# Patient Record
Sex: MALE | Race: WHITE | NOT HISPANIC OR LATINO | URBAN - METROPOLITAN AREA
[De-identification: names, ages, dates, MRNs, and addresses within clinical notes are randomized per-mention and may not be internally consistent; named-entity substitution may affect disease eponyms.]

---

## 2017-08-11 ENCOUNTER — EMERGENCY (EMERGENCY)
Facility: HOSPITAL | Age: 42
LOS: 1 days | Discharge: LEFT BEFORE DISCHARGE | End: 2017-08-11
Attending: EMERGENCY MEDICINE | Admitting: EMERGENCY MEDICINE
Payer: COMMERCIAL

## 2017-08-11 VITALS
TEMPERATURE: 98 F | WEIGHT: 139.99 LBS | RESPIRATION RATE: 17 BRPM | HEART RATE: 82 BPM | DIASTOLIC BLOOD PRESSURE: 88 MMHG | SYSTOLIC BLOOD PRESSURE: 135 MMHG | OXYGEN SATURATION: 97 %

## 2017-08-11 DIAGNOSIS — R06.02 SHORTNESS OF BREATH: ICD-10-CM

## 2017-08-11 DIAGNOSIS — T81.9XXA UNSPECIFIED COMPLICATION OF PROCEDURE, INITIAL ENCOUNTER: ICD-10-CM

## 2017-08-11 PROCEDURE — 71250 CT THORAX DX C-: CPT

## 2017-08-11 PROCEDURE — 99284 EMERGENCY DEPT VISIT MOD MDM: CPT | Mod: 25

## 2017-08-11 PROCEDURE — 99284 EMERGENCY DEPT VISIT MOD MDM: CPT

## 2017-08-11 PROCEDURE — 71250 CT THORAX DX C-: CPT | Mod: 26

## 2017-08-11 NOTE — ED ADULT NURSE NOTE - CHPI ED SYMPTOMS NEG
no wheezing/no chills/no edema/no body aches/no headache/no chest pain/no hemoptysis/no fever/no diaphoresis/no cough

## 2017-08-11 NOTE — ED ADULT NURSE NOTE - PERIPHERAL VASCULAR
History of Present Illness





- History of Present Illness


History of Present Illness: 


Reason For Consultation: Abnormal EKG, Dyspnea





History Of Present Illness


Patient presents with shortness of breath, wheezing. Did 1 neb treatment at home

, but no improvement. Speaking in 2-3 word sentences





Past Medical History


Reviewed: Historical Data, Nursing Documentation, Vital Signs


PMH: Arthritis, Asthma, Gastritis, HTN


   Denies: Chronic Kidney Disease


Surgical History: Endoscopy (2013)





- [facility] Procedures








EXCISION OF UPPER ESOPHAGUS, ENDO, DIAGN (04/27/16)








Family History: States: No Known Family Hx





- Social History


Hx Alcohol Use: No


Hx Substance Use: No





Review Of Systems


Constitutional: Negative for: Fever, Chills


Eyes: Negative for: Redness


ENT: Negative for: Throat Pain


Cardiovascular: Negative for: Chest Pain, Palpitations


Respiratory: Positive for: Shortness of Breath, Wheezing


Gastrointestinal: Negative for: Nausea, Vomiting


Genitourinary: Negative for: Dysuria


Musculoskeletal: Negative for: Back Pain


Skin: Negative for: Rash, Lesions, Jaundice, Bruising


Neurological: Negative for: Weakness


Psych: Negative for: Anxiety





Physical Exam





- Physical Exam


Appears: In Acute Distress


Skin: Warm, Dry


Head: Normacephalic


Eye(s): bilateral: Normal Inspection


Oral Mucosa: Moist


Throat: No Erythema


Neck: Trachea Midline, Supple


Chest: Symmetrical


Cardiovascular: Rhythm Regular


Respiratory: Decreased Breath Sounds, No Rales, No Rhonchi, Wheezing


Gastrointestinal/Abdominal: Soft, No Tenderness, No Distention, No Guarding


Back: Normal Inspection


Extremity: Normal ROM


Extremity: Bilateral: Atraumatic, Normal Color And Temperature, Normal ROM


Neurological/Psych: Oriented x3, Normal Speech, Normal Cognition


Gait: Unable To Assess





Past Patient History





- Infectious Disease


Hx of Infectious Diseases: None





- Past Medical History & Family History


Past Medical History?: Yes





- Past Social History


Smoking Status: Former Smoker





- CARDIAC


Hx Hypertension: Yes





- PULMONARY


Hx Asthma: Yes





- NEUROLOGICAL


Hx Neurological Disorder: No





- HEENT


Hx HEENT Problems: No





- RENAL


Hx Chronic Kidney Disease: No





- ENDOCRINE/METABOLIC


Hx Endocrine Disorders: Yes


Hx Diabetes Mellitus Type 1: Yes





- HEMATOLOGICAL/ONCOLOGICAL


Hx Blood Disorders: No





- INTEGUMENTARY


Hx Dermatological Problems: Yes


Other/Comment: SKIN ASTHMA





- MUSCULOSKELETAL/RHEUMATOLOGICAL


Hx Falls: No





- GASTROINTESTINAL


Hx Gastritis: Yes





- GENITOURINARY/GYNECOLOGICAL


Hx Genitourinary Disorders: Yes


Hx Prostate Problems: Yes





- PSYCHIATRIC


Hx Substance Use: No





- SURGICAL HISTORY


Hx Surgeries: Yes





- ANESTHESIA


Hx Anesthesia: Yes


Hx Anesthesia Reactions: No


Hx Malignant Hyperthermia: No





Meds


Allergies/Adverse Reactions: 


 Allergies











Allergy/AdvReac Type Severity Reaction Status Date / Time


 


seasonal allergies Allergy Mild CONGESTION Uncoded 05/16/17 05:54














- Medications


Medications: 


 Current Medications





Albuterol/Ipratropium (Duoneb 3 Mg/0.5 Mg (3 Ml) Ud)  3 ml INH RQ6 [facility]


   Last Admin: 05/17/17 19:30 Dose:  3 ml


Amlodipine Besylate (Norvasc)  10 mg PO DAILY [facility]


   Last Admin: 05/17/17 09:40 Dose:  10 mg


Enoxaparin Sodium (Lovenox)  40 mg SC DAILY [facility]


   Last Admin: 05/17/17 09:40 Dose:  40 mg


Finasteride (Proscar)  5 mg PO DAILY [facility]


   Last Admin: 05/17/17 09:40 Dose:  5 mg


Piperacillin Sod/Tazobactam Sod (Zosyn 3.375 Gm Iv Premix)  3.375 gm in 50 mls 

@ 100 mls/hr IVPB Q6H [facility]


   Last Admin: 05/17/17 22:06 Dose:  100 mls/hr


Azithromycin 500 mg/ Sodium (Chloride)  250 mls @ 250 mls/hr IVPB DAILY@1030 [facility]


   Last Admin: 05/17/17 09:46 Dose:  250 mls/hr


Insulin Glargine (Lantus)  10 unit SC Q12 [facility]


   Last Admin: 05/17/17 22:07 Dose:  10 units


Insulin Human Regular (Novolin R)  0 unit SC ACHS [facility]


   PRN Reason: Protocol


   Last Admin: 05/17/17 22:08 Dose:  3 unit


Losartan Potassium (Cozaar)  100 mg PO DAILY [facility]


   Last Admin: 05/17/17 09:41 Dose:  100 mg


Metformin HCl (Glucophage)  500 mg PO BIDCC [facility]


   Last Admin: 05/17/17 16:46 Dose:  500 mg


Methylprednisolone (Solu-Medrol)  40 mg IVP Q8H [facility]


   Last Admin: 05/17/17 17:49 Dose:  40 mg


Pantoprazole Sodium (Protonix Inj)  40 mg IVP DAILY [facility]


   Last Admin: 05/17/17 09:41 Dose:  40 mg


Promethazine HCl/Dextromethorphan (Phenergan Dm Syrup)  5 ml PO Q6H [facility]


   PRN Reason: Cough and congestion


Tiotropium Bromide (Spiriva)  18 mcg INH RQ24 [facility]


   Last Admin: 05/17/17 07:31 Dose:  18 mcg











Results





- Vital Signs


Recent Vital Signs: 


 Last Vital Signs











Temp  98.2 F   05/17/17 21:00


 


Pulse  96 H  05/17/17 21:00


 


Resp  18   05/17/17 21:00


 


BP  117/69   05/17/17 21:00


 


Pulse Ox  98   05/17/17 21:00














- Labs


Result Diagrams: 


 05/17/17 06:34





 05/17/17 06:34


Labs: 


 Laboratory Results - last 24 hr











  05/17/17 05/17/17 05/17/17





  02:59 06:34 06:34


 


WBC   18.1 H 


 


RBC   5.33 


 


Hgb   12.8 


 


Hct   40.0 


 


MCV   75.1 L 


 


MCH   24.1 L 


 


MCHC   32.0 L 


 


RDW   18.4 H 


 


Plt Count   239 


 


MPV   8.4 


 


Neut % (Auto)   94.3 H 


 


Lymph % (Auto)   2.6 L 


 


Mono % (Auto)   2.9 


 


Eos % (Auto)   0.1 


 


Baso % (Auto)   0.1 


 


Neut #   17.1 H 


 


Lymph #   0.5 L 


 


Mono #   0.5 


 


Eos #   0.0 


 


Baso #   0.0 


 


Neutrophils % (Manual)   93 H 


 


Band Neutrophils %   2 


 


Lymphocytes % (Manual)   4 L 


 


Monocytes % (Manual)   1 


 


Toxic Granulation   Present 


 


Platelet Estimate   Normal 


 


Large Platelets   Present 


 


Poikilocytosis (manual   Slight 


 


Anisocytosis (manual)   Slight 


 


Ovalocytes   Slight 


 


Sodium    132


 


Potassium    4.0


 


Chloride    96 L


 


Carbon Dioxide    25


 


Anion Gap    15


 


BUN    25 H


 


Creatinine    0.8


 


Est GFR ( Amer)    > 60


 


Est GFR (Non-Af Amer)    > 60


 


POC Glucose (mg/dL)  202 H  


 


Random Glucose    210 H


 


Calcium    8.0 L


 


Phosphorus    3.4


 


Magnesium    2.1


 


Total Bilirubin    0.8


 


AST    19


 


ALT    37


 


Alkaline Phosphatase    49


 


Total Protein    6.2 L


 


Albumin    2.9 L


 


Globulin    3.4


 


Albumin/Globulin Ratio    0.9 L


 


Amylase   


 


Lipase   


 


Carcinoembryonic Ag   


 


CA 19-9 Antigen   














  05/17/17 05/17/17 05/17/17





  07:16 11:15 13:50


 


WBC   


 


RBC   


 


Hgb   


 


Hct   


 


MCV   


 


MCH   


 


MCHC   


 


RDW   


 


Plt Count   


 


MPV   


 


Neut % (Auto)   


 


Lymph % (Auto)   


 


Mono % (Auto)   


 


Eos % (Auto)   


 


Baso % (Auto)   


 


Neut #   


 


Lymph #   


 


Mono #   


 


Eos #   


 


Baso #   


 


Neutrophils % (Manual)   


 


Band Neutrophils %   


 


Lymphocytes % (Manual)   


 


Monocytes % (Manual)   


 


Toxic Granulation   


 


Platelet Estimate   


 


Large Platelets   


 


Poikilocytosis (manual   


 


Anisocytosis (manual)   


 


Ovalocytes   


 


Sodium   


 


Potassium   


 


Chloride   


 


Carbon Dioxide   


 


Anion Gap   


 


BUN   


 


Creatinine   


 


Est GFR ( Amer)   


 


Est GFR (Non-Af Amer)   


 


POC Glucose (mg/dL)  248 H  307 H 


 


Random Glucose   


 


Calcium   


 


Phosphorus   


 


Magnesium   


 


Total Bilirubin   


 


AST   


 


ALT   


 


Alkaline Phosphatase   


 


Total Protein   


 


Albumin   


 


Globulin   


 


Albumin/Globulin Ratio   


 


Amylase    45


 


Lipase    38


 


Carcinoembryonic Ag    3.3 H


 


CA 19-9 Antigen    39.3 H














  05/17/17 05/17/17





  16:12 21:04


 


WBC  


 


RBC  


 


Hgb  


 


Hct  


 


MCV  


 


MCH  


 


MCHC  


 


RDW  


 


Plt Count  


 


MPV  


 


Neut % (Auto)  


 


Lymph % (Auto)  


 


Mono % (Auto)  


 


Eos % (Auto)  


 


Baso % (Auto)  


 


Neut #  


 


Lymph #  


 


Mono #  


 


Eos #  


 


Baso #  


 


Neutrophils % (Manual)  


 


Band Neutrophils %  


 


Lymphocytes % (Manual)  


 


Monocytes % (Manual)  


 


Toxic Granulation  


 


Platelet Estimate  


 


Large Platelets  


 


Poikilocytosis (manual  


 


Anisocytosis (manual)  


 


Ovalocytes  


 


Sodium  


 


Potassium  


 


Chloride  


 


Carbon Dioxide  


 


Anion Gap  


 


BUN  


 


Creatinine  


 


Est GFR ( Amer)  


 


Est GFR (Non-Af Amer)  


 


POC Glucose (mg/dL)  285 H  350 H


 


Random Glucose  


 


Calcium  


 


Phosphorus  


 


Magnesium  


 


Total Bilirubin  


 


AST  


 


ALT  


 


Alkaline Phosphatase  


 


Total Protein  


 


Albumin  


 


Globulin  


 


Albumin/Globulin Ratio  


 


Amylase  


 


Lipase  


 


Carcinoembryonic Ag  


 


CA 19-9 Antigen  














Assessment & Plan





- Assessment and Plan (Free Text)


Assessment: 





Patient was a Chronic smoker


Admitted for COPD exacerbation


Abnormal EKG (LBBB and multiple PVCs)


Caronary risk factors


Not a good candidate for stress test


For Cath Friday


Check ECHO
WDL

## 2017-08-11 NOTE — ED ADULT NURSE NOTE - CHIEF COMPLAINT QUOTE
Sent in by Dr. Shayan Richardson ([phone]) for recurrent Right lung fluid and ct chest.  Hx: s/p chest tube placement for pneumothorax 1 month ago.  Denies discomfort, fever, chills, sob.  Patient refusing ekg at triage - "Im just here for the cat scan."

## 2017-08-11 NOTE — ED ADULT NURSE NOTE - OBJECTIVE STATEMENT
42 y/o male c/o medical evaluation. s/p chest tube removal after a pneumothorax of the right lung, now states Dr. Reeves sent him to the ER to get a CT scan to evaluate for fluid in the right lung. pt refusing EKG and labs, states "I am only here for the CT scan." denies fever/chills, chest pain, n/v/d, headache, dizziness. reports mild SOB on exertion.

## 2017-08-11 NOTE — ED PROVIDER NOTE - MEDICAL DECISION MAKING DETAILS
42 y/o m sent by internist for CT chest for further evaluation of ?fluid in right lung fissure; pt refusing labs and EKG in ED, just wanted CT which is what his doctor recommended.  Pt received CT, and promptly left ED telling RN he didn't want to wait.  No discussion regarding signing out AMA with pt, pt eloped during treatment.  CT resulted after showing no pneumothorax or effusion, incidental finding of lung nodule discussed with Dr. Levi Reeves.

## 2017-08-11 NOTE — ED ADULT NURSE REASSESSMENT NOTE - NS ED NURSE REASSESS COMMENT FT1
pt uncooperative since arrival. refusing all labs, EKG, stated he was only here for CT scan. pt had CT scan and then walked out stating "I don't need my results, my doctor will see them." no IV intact, ambulated out of ED with steady gait. MD aware.

## 2017-08-11 NOTE — ED ADULT TRIAGE NOTE - CHIEF COMPLAINT QUOTE
Sent in by Dr. Shayan Richardson ([phone]) for recurrent Right lung fluid and ct chest.  Hx: s/p chest tube placement for pneumothorax 1 month ago.  Denies discomfort, fever, chills, sob.  Patient refusing ekg at triage - "Im just here for the cat scan."

## 2017-08-11 NOTE — ED PROVIDER NOTE - RESPIRATORY, MLM
Breath sounds clear and equal bilaterally.  Right chest wall with healed incision laterally from previous chest tube
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
## 2023-09-29 ENCOUNTER — EMERGENCY (EMERGENCY)
Facility: HOSPITAL | Age: 48
LOS: 1 days | Discharge: HOME | End: 2023-09-29
Attending: EMERGENCY MEDICINE | Admitting: EMERGENCY MEDICINE
Payer: MEDICAID

## 2023-09-29 VITALS
OXYGEN SATURATION: 97 % | HEART RATE: 77 BPM | DIASTOLIC BLOOD PRESSURE: 83 MMHG | TEMPERATURE: 100 F | RESPIRATION RATE: 18 BRPM | SYSTOLIC BLOOD PRESSURE: 187 MMHG

## 2023-09-29 VITALS — TEMPERATURE: 100 F

## 2023-09-29 PROCEDURE — 99223 1ST HOSP IP/OBS HIGH 75: CPT

## 2023-09-29 RX ORDER — CEFAZOLIN SODIUM 1 G
2000 VIAL (EA) INJECTION ONCE
Refills: 0 | Status: COMPLETED | OUTPATIENT
Start: 2023-09-29 | End: 2023-09-29

## 2023-09-29 RX ORDER — ACETAMINOPHEN 500 MG
975 TABLET ORAL ONCE
Refills: 0 | Status: COMPLETED | OUTPATIENT
Start: 2023-09-29 | End: 2023-09-29

## 2023-09-29 RX ORDER — IBUPROFEN 200 MG
600 TABLET ORAL ONCE
Refills: 0 | Status: COMPLETED | OUTPATIENT
Start: 2023-09-29 | End: 2023-09-29

## 2023-09-29 RX ORDER — SODIUM CHLORIDE 9 MG/ML
1000 INJECTION INTRAMUSCULAR; INTRAVENOUS; SUBCUTANEOUS ONCE
Refills: 0 | Status: COMPLETED | OUTPATIENT
Start: 2023-09-29 | End: 2023-09-29

## 2023-09-29 NOTE — ED CDU PROVIDER INITIAL DAY NOTE - PROGRESS NOTE DETAILS
Patient was evaluated at bedside endorsed to me at 8 PM.  Patient is resting comfortably in stretcher responsive to verbal stimuli. Patient was reevaluated multiple times by myself and nurse Nimco, multiple attempts were made by myself and nursing to establish an IV access patient refuses as soon as the Angiocath is attempted, I fully examined his wounds he has multiple stage I–2 ulcerations over the lower extremities not circumferential with mild surrounding warmth no streaking erythema there are regions of healing with no active drainage, I applied a Xeroform and bulky dressings to both lower extremities along with hospital socks and he was given hard shows shoes as he does not have shoes.  Patient was offered admission or observation he was also offered a dose of Ancef, as well as antipyretics Tylenol and ibuprofen for oral temp of 99.  When I attempted to do a rectal temp he refused.  Patient is seen walking back and forth to the restroom.  He subsequently moved his bowels on the floor of exam room.  Then ambulated to the restroom again.  Patient registered his name and date of birth.  Final attempts were made to have patient be placed in observation and do lab works and give antibiotics and antipyretics and patient adamantly declines.  He was offered Suboxone he declines.  Patient subsequently ambulated out of the emergency department Patient was reevaluated multiple times by myself and nurse Nimco, multiple attempts were made by myself and nursing to establish an IV access patient refuses as soon as the Angiocath is attempted, I fully examined his wounds he has multiple stage I–2 ulcerations over the lower extremities not circumferential with mild surrounding warmth no streaking erythema there are regions of healing with no active drainage, I applied a Xeroform and bulky dressings to both lower extremities along with hospital socks and he was given hard shows shoes as he does not have shoes.  Patient was offered admission or observation he was also offered a dose of Ancef, as well as antipyretics Tylenol and ibuprofen for oral temp of 99.  When I attempted to do a rectal temp he refused.  Patient is seen walking back and forth to the restroom.  He subsequently moved his bowels on the floor of exam room.  Then ambulated to the restroom again.  Patient registered his name and date of birth.  Final attempts were made to have patient be placed in observation and do lab works and give antibiotics and antipyretics and patient adamantly declines.  He was offered Suboxone he declines.  Patient subsequently ambulated out of the emergency department. Patient endorses he uses IV heroine, currently homeless.

## 2023-09-29 NOTE — ED ADULT NURSE REASSESSMENT NOTE - NS ED NURSE REASSESS COMMENT FT1
RN attempted to place an IV and obtain labs on the pt. The pt refused. RN educated pt on need to place IV and obtain labs due to wounds pt has. Pt still refused. MD Génesis Aguilera made aware.
Patient received from DE shift, notified of refusal of all nursing care. Pt continues to refuse IV placement, medications.

## 2023-09-29 NOTE — ED CDU PROVIDER DISPOSITION NOTE - PATIENT PORTAL LINK FT
You can access the FollowMyHealth Patient Portal offered by [facility] by registering at the following website: [email or web address]. By joining [employer]’s FollowMyHealth portal, you will also be able to view your health information using other applications (apps) compatible with our system.

## 2023-09-29 NOTE — ED PROVIDER NOTE - PHYSICAL EXAMINATION
Gen: sleeping, says "ow" to pain but does not open eyes.. HEENT: NCAT, mmm   Chest: RRR, nl S1 and S2, no m/r/g. Resp: CTAB, no w/r/r  Abd: nl BS, soft, nt/nd. Ext: Warm, dry. 2+pitting edema to b/l knees with numerous venous stasis ulcers. no erythema or purulence.   Neuro: CN II-XII intact, moves all extremities

## 2023-09-29 NOTE — ED ADULT NURSE NOTE - CHIEF COMPLAINT QUOTE
PT BIBEMS from [facility] after nypd called. PT uncooperative with assessment. PT with wounds to b/l legs. Pt denies drug and alochol use.

## 2023-09-29 NOTE — ED ADULT NURSE NOTE - OBJECTIVE STATEMENT
[place] Pt has wounds throughout legs and a few on arms. Pt refusing to cooperate for an assessment or for lab work. Pt stable, not in any acute distress.

## 2023-09-29 NOTE — ED ADULT NURSE NOTE - NSFALLUNIVINTERV_ED_ALL_ED
Bed/Stretcher in lowest position, wheels locked, appropriate side rails in place/Call bell, personal items and telephone in reach/Instruct patient to call for assistance before getting out of bed/chair/stretcher/Non-slip footwear applied when patient is off stretcher/[place] to call system/Physically safe environment - no spills, clutter or unnecessary equipment/Purposeful proactive rounding/Room/bathroom lighting operational, light cord in reach

## 2023-09-29 NOTE — ED ADULT TRIAGE NOTE - CHIEF COMPLAINT QUOTE
PT BIBEMS from [facility] after nypd called. PT uncooperative with assessment. PT with wounds to b/l legs. Pt denies drug and alochol use.

## 2023-09-29 NOTE — ED CDU PROVIDER DISPOSITION NOTE - CLINICAL COURSE
Patient was initially evaluated and labs treatment were ordered patient repeatedly declined work-up admission and further work-up.  Patient declining to have discussion regarding the need for work-up he was evaluated wounds were dressed he subsequently ambulated from ED discharge.

## 2023-09-29 NOTE — ED PROVIDER NOTE - PROGRESS NOTE DETAILS
Patient refused labs, but not answering other questions. Will reassess. Patient walked to bathroom with steady gait. Speech clear. Said his name is Gerson. Patient now back in the bed, sleeping. Opens eyes and says "ow" to sternal rub but does not answer orientation questions. Will place on obs. Signed out to Dr. Bernard.

## 2023-09-29 NOTE — ED PROVIDER NOTE - OBJECTIVE STATEMENT
[place] with altered mental status. Noted to have multiple wounds on legs. Unable to obtain ROS 2/2 AMS.

## 2023-10-02 DIAGNOSIS — F19.10 OTHER PSYCHOACTIVE SUBSTANCE ABUSE, UNCOMPLICATED: ICD-10-CM

## 2023-10-02 DIAGNOSIS — R41.82 ALTERED MENTAL STATUS, UNSPECIFIED: ICD-10-CM

## 2023-10-02 DIAGNOSIS — L97.919 NON-PRESSURE CHRONIC ULCER OF UNSPECIFIED PART OF RIGHT LOWER LEG WITH UNSPECIFIED SEVERITY: ICD-10-CM

## 2023-10-02 DIAGNOSIS — L97.929 NON-PRESSURE CHRONIC ULCER OF UNSPECIFIED PART OF LEFT LOWER LEG WITH UNSPECIFIED SEVERITY: ICD-10-CM

## 2023-11-27 ENCOUNTER — EMERGENCY (EMERGENCY)
Facility: HOSPITAL | Age: 48
LOS: 1 days | Discharge: LEFT BEFORE DISCHARGE | End: 2023-11-27
Attending: EMERGENCY MEDICINE | Admitting: EMERGENCY MEDICINE
Payer: MEDICAID

## 2023-11-27 VITALS
RESPIRATION RATE: 18 BRPM | SYSTOLIC BLOOD PRESSURE: 180 MMHG | DIASTOLIC BLOOD PRESSURE: 100 MMHG | TEMPERATURE: 98 F | OXYGEN SATURATION: 97 % | HEART RATE: 87 BPM

## 2023-11-27 PROCEDURE — 99284 EMERGENCY DEPT VISIT MOD MDM: CPT

## 2023-11-27 NOTE — ED ADULT TRIAGE NOTE - CHIEF COMPLAINT QUOTE
pt. presents to ED c/o abdominal pain, pt. refusing to answer questions in triage walked directly to the hallway and laid on a bed stating he needed to sleep. Pt. reports just leaving another hospital for the same complaint but "they didn't do anything".

## 2023-11-27 NOTE — ED PROVIDER NOTE - CCCP TRG CHIEF CMPLNT
Anticoagulation Summary  As of 3/10/2022    INR goal:  2.0-3.0   TTR:  59.4 % (6.6 y)   INR used for dosin.40 (3/10/2022)   Warfarin maintenance plan:  5 mg (5 mg x 1) every Tue, Fri; 2.5 mg (5 mg x 0.5) all other days   Weekly warfarin total:  22.5 mg   Plan last modified:  Becca Galvez, PharmD (2021)   Next INR check:  3/24/2022   Target end date:  Indefinite    Indications    Atrial fibrillation (HCC) (Resolved) [I48.91]  Chronic anticoagulation [Z79.01]  Secondary hypercoagulable state (HCC) [D68.69]             Anticoagulation Episode Summary     INR check location:  Anticoagulation Clinic    Preferred lab:      Send INR reminders to:      Comments:  karine      Anticoagulation Care Providers     Provider Role Specialty Phone number    Florian Carnes M.D. Referring Interventional Cardiology [phone]    [facility] Responsible  [phone]        Anticoagulation Patient Findings          Spoke with Rachelle to report a therapeutic INR of 2.4. Continue current dosing regimen.  Follow up in 2 weeks, to reduce the risk of adverse events related to this high risk medication, warfarin.    Patrizia Arias, Clinical Pharmacist      
abdominal pain

## 2023-11-27 NOTE — ED PROVIDER NOTE - CLINICAL SUMMARY MEDICAL DECISION MAKING FREE TEXT BOX
Patient is a poor historian and presents reporting epigastric pain.  Exam shows a soft nondistended abdomen with no peritoneal signs.  Will order for basic labs and abdominal labs.  We will reevaluate.

## 2023-11-27 NOTE — ED PROVIDER NOTE - PHYSICAL EXAMINATION
VITAL SIGNS: I have reviewed nursing notes and confirm.  CONSTITUTIONAL: In no apparent distress.  SKIN: Skin is warm and dry, no acute rash.  HEAD: Normocephalic; atraumatic.  EYES: PERRL, EOM intact; conjunctiva and sclera clear.  ENT: No nasal discharge; airway clear.  NECK: Supple; non tender.  CARD: S1, S2 normal; no murmurs, gallops, or rubs. Regular rate and rhythm.  RESP: No wheezes, rales or rhonchi.  ABD: Soft; non-distended; no hepatosplenomegaly.  Reports TTP in epigastric area, no guarding or rigidity.   MSK: Normal ROM. No clubbing, cyanosis or edema.  NEURO: Alert, oriented. Grossly unremarkable.  PSYCH: Cooperative, appropriate.

## 2023-11-27 NOTE — ED PROVIDER NOTE - OBJECTIVE STATEMENT
Patient is a 48-year-old male who presents via EMS for abdominal pain.  To note patient is a poor historian and reluctant to answer multiple questions.  He points to his epigastric area when indicating area of pain.  He cannot tell me about any associated symptoms or timeline of pain.  He keeps requesting a place to sleep.  Patient attempted to get into a stretcher that was not his.  Uncooperative with rest of history.

## 2023-11-28 DIAGNOSIS — R10.13 EPIGASTRIC PAIN: ICD-10-CM

## 2023-11-28 DIAGNOSIS — Z53.29 PROCEDURE AND TREATMENT NOT CARRIED OUT BECAUSE OF PATIENT'S DECISION FOR OTHER REASONS: ICD-10-CM

## 2025-03-24 NOTE — ED ADULT NURSE NOTE - PATIENT'S GENDER IDENTITY
Male PAST MEDICAL HISTORY:  CAD (coronary artery disease)     Colonic mass     Essential hypertension     HLD (hyperlipidemia)     KG (iron deficiency anemia)     Squamous cell cancer of lip